# Patient Record
Sex: FEMALE | Race: BLACK OR AFRICAN AMERICAN | NOT HISPANIC OR LATINO | Employment: FULL TIME | URBAN - METROPOLITAN AREA
[De-identification: names, ages, dates, MRNs, and addresses within clinical notes are randomized per-mention and may not be internally consistent; named-entity substitution may affect disease eponyms.]

---

## 2022-05-06 ENCOUNTER — PATIENT MESSAGE (OUTPATIENT)
Dept: OBSTETRICS AND GYNECOLOGY | Facility: CLINIC | Age: 22
End: 2022-05-06

## 2023-02-27 ENCOUNTER — NURSE TRIAGE (OUTPATIENT)
Dept: ADMINISTRATIVE | Facility: CLINIC | Age: 23
End: 2023-02-27
Payer: MEDICAID

## 2023-02-27 NOTE — TELEPHONE ENCOUNTER
Patient states she is currently pregnant, unknown weeks gestation and c/o morning sickness, abdominal pain and weakness. Patient states she does not have an OB/Gyn at this time. Patient states she vomits once a day in the AM.    Care Advice given to See OB/Gyn or to Visit the [facility] today. Patient states understanding of care advice and cites no additional concerns at this time.       Reason for Disposition   MILD vomiting (e.g., 1-2 times / day) and present > 3 days and started after the 9th week of pregnancy    Additional Information   Negative: Sounds like a life-threatening emergency to the triager   Negative: Shock suspected (e.g., cold/pale/clammy skin, too weak to stand, low BP, rapid pulse)   Negative: Sounds like a life-threatening emergency to the triager   Nausea or vomiting and pregnancy < 20 weeks   Negative: Vaginal bleeding and pregnant < 20 weeks   Negative: Abdominal pain and pregnant < 20 weeks   Negative: Vomiting did NOT begin in early pregnancy (i.e., 4th-8th week of pregnancy) OR 20 or more weeks pregnant at time of call   Negative: Headache is main symptom   Negative: Vomiting red blood or black (coffee ground) material   Negative: Insulin-dependent diabetes (Type I) and glucose > 400 mg/dL (22 mmol/L)   Negative: Recent head injury (within last 3 days)   Negative: Recent abdominal injury (within last 3 days)   Negative: Severe pain in one eye   Negative: SEVERE vomiting (e.g., > 6 times / day) and present > 8 hours   Negative: Unable to keep ANY liquids down (without vomiting) this past 24 hours   Negative: Drinking very little and has signs of dehydration (e.g., no urine > 12 hours, very dry mouth)   Negative: Patient sounds very sick or weak to the triager   Negative: MODERATE vomiting (e.g., 3 - 5 times / day) and present > 3 days   Negative: Weight loss > 5 pounds (2.5 kg) in last 2 weeks   Negative: Pain or burning with passing urine (urination)   Negative:  Substance use (drug use) or unhealthy alcohol use, known or suspected   Negative: High-risk adult (e.g., diabetes mellitus, AIDS/HIV)   Negative: Patient wants to be seen   Negative: Vomiting an essential or prescribed medication (Exception: prenatal vitamins)    Protocols used: Nausea-A-OH, Pregnancy - Morning Sickness (Nausea and Vomiting of Pregnancy)-A-OH

## 2023-02-28 ENCOUNTER — HOSPITAL ENCOUNTER (EMERGENCY)
Facility: HOSPITAL | Age: 23
Discharge: HOME | End: 2023-02-28
Attending: EMERGENCY MEDICINE
Payer: MEDICAID

## 2023-02-28 VITALS
DIASTOLIC BLOOD PRESSURE: 59 MMHG | BODY MASS INDEX: 24.18 KG/M2 | WEIGHT: 128.06 LBS | OXYGEN SATURATION: 100 % | RESPIRATION RATE: 20 BRPM | SYSTOLIC BLOOD PRESSURE: 92 MMHG | HEART RATE: 81 BPM | HEIGHT: 61 IN | TEMPERATURE: 98 F

## 2023-02-28 DIAGNOSIS — O20.0 THREATENED ABORTION: Primary | ICD-10-CM

## 2023-02-28 DIAGNOSIS — N39.0 URINARY TRACT INFECTION WITHOUT HEMATURIA, SITE UNSPECIFIED: ICD-10-CM

## 2023-02-28 LAB
ABO + RH BLD: NORMAL
ALBUMIN SERPL BCP-MCNC: 4 G/DL (ref 3.5–5.2)
ALP SERPL-CCNC: 54 U/L (ref 55–135)
ALT SERPL W/O P-5'-P-CCNC: 12 U/L (ref 10–44)
ANION GAP SERPL CALC-SCNC: 9 MMOL/L (ref 8–16)
AST SERPL-CCNC: 15 U/L (ref 10–40)
B-HCG UR QL: POSITIVE
BACTERIA #/AREA URNS HPF: ABNORMAL /HPF
BASOPHILS # BLD AUTO: 0.08 K/UL (ref 0–0.2)
BASOPHILS NFR BLD: 0.7 % (ref 0–1.9)
BILIRUB SERPL-MCNC: 0.5 MG/DL (ref 0.1–1)
BILIRUB UR QL STRIP: NEGATIVE
BUN SERPL-MCNC: 6 MG/DL (ref 6–20)
CALCIUM SERPL-MCNC: 9.3 MG/DL (ref 8.7–10.5)
CHLORIDE SERPL-SCNC: 102 MMOL/L (ref 95–110)
CLARITY UR: ABNORMAL
CO2 SERPL-SCNC: 22 MMOL/L (ref 23–29)
COLOR UR: YELLOW
CREAT SERPL-MCNC: 0.6 MG/DL (ref 0.5–1.4)
CTP QC/QA: YES
DIFFERENTIAL METHOD: ABNORMAL
EOSINOPHIL # BLD AUTO: 0 K/UL (ref 0–0.5)
EOSINOPHIL NFR BLD: 0.3 % (ref 0–8)
ERYTHROCYTE [DISTWIDTH] IN BLOOD BY AUTOMATED COUNT: 14.1 % (ref 11.5–14.5)
EST. GFR  (NO RACE VARIABLE): >60 ML/MIN/1.73 M^2
GLUCOSE SERPL-MCNC: 77 MG/DL (ref 70–110)
GLUCOSE UR QL STRIP: NEGATIVE
HCG INTACT+B SERPL-ACNC: NORMAL MIU/ML
HCT VFR BLD AUTO: 37.3 % (ref 37–48.5)
HGB BLD-MCNC: 12.5 G/DL (ref 12–16)
HGB UR QL STRIP: NEGATIVE
HYALINE CASTS #/AREA URNS LPF: 25 /LPF
IMM GRANULOCYTES # BLD AUTO: 0.04 K/UL (ref 0–0.04)
IMM GRANULOCYTES NFR BLD AUTO: 0.4 % (ref 0–0.5)
KETONES UR QL STRIP: ABNORMAL
LEUKOCYTE ESTERASE UR QL STRIP: NEGATIVE
LYMPHOCYTES # BLD AUTO: 2 K/UL (ref 1–4.8)
LYMPHOCYTES NFR BLD: 17.7 % (ref 18–48)
MCH RBC QN AUTO: 26.4 PG (ref 27–31)
MCHC RBC AUTO-ENTMCNC: 33.5 G/DL (ref 32–36)
MCV RBC AUTO: 79 FL (ref 82–98)
MICROSCOPIC COMMENT: ABNORMAL
MONOCYTES # BLD AUTO: 0.9 K/UL (ref 0.3–1)
MONOCYTES NFR BLD: 7.8 % (ref 4–15)
NEUTROPHILS # BLD AUTO: 8 K/UL (ref 1.8–7.7)
NEUTROPHILS NFR BLD: 73.1 % (ref 38–73)
NITRITE UR QL STRIP: POSITIVE
NRBC BLD-RTO: 0 /100 WBC
PH UR STRIP: 6 [PH] (ref 5–8)
PLATELET # BLD AUTO: 363 K/UL (ref 150–450)
PMV BLD AUTO: 9.2 FL (ref 9.2–12.9)
POTASSIUM SERPL-SCNC: 3.8 MMOL/L (ref 3.5–5.1)
PROT SERPL-MCNC: 7.7 G/DL (ref 6–8.4)
PROT UR QL STRIP: ABNORMAL
RBC # BLD AUTO: 4.74 M/UL (ref 4–5.4)
RBC #/AREA URNS HPF: 7 /HPF (ref 0–4)
SODIUM SERPL-SCNC: 133 MMOL/L (ref 136–145)
SP GR UR STRIP: >1.03 (ref 1–1.03)
SQUAMOUS #/AREA URNS HPF: 11 /HPF
URN SPEC COLLECT METH UR: ABNORMAL
UROBILINOGEN UR STRIP-ACNC: NEGATIVE EU/DL
WBC # BLD AUTO: 10.99 K/UL (ref 3.9–12.7)
WBC #/AREA URNS HPF: 11 /HPF (ref 0–5)

## 2023-02-28 PROCEDURE — 86900 BLOOD TYPING SEROLOGIC ABO: CPT | Performed by: EMERGENCY MEDICINE

## 2023-02-28 PROCEDURE — 25000003 PHARM REV CODE 250: Performed by: EMERGENCY MEDICINE

## 2023-02-28 PROCEDURE — 96374 THER/PROPH/DIAG INJ IV PUSH: CPT

## 2023-02-28 PROCEDURE — 63600175 PHARM REV CODE 636 W HCPCS: Performed by: EMERGENCY MEDICINE

## 2023-02-28 PROCEDURE — 84702 CHORIONIC GONADOTROPIN TEST: CPT | Performed by: EMERGENCY MEDICINE

## 2023-02-28 PROCEDURE — 80053 COMPREHEN METABOLIC PANEL: CPT | Performed by: EMERGENCY MEDICINE

## 2023-02-28 PROCEDURE — 81001 URINALYSIS AUTO W/SCOPE: CPT | Performed by: EMERGENCY MEDICINE

## 2023-02-28 PROCEDURE — 85025 COMPLETE CBC W/AUTO DIFF WBC: CPT | Performed by: EMERGENCY MEDICINE

## 2023-02-28 PROCEDURE — 87086 URINE CULTURE/COLONY COUNT: CPT | Performed by: EMERGENCY MEDICINE

## 2023-02-28 PROCEDURE — 99285 EMERGENCY DEPT VISIT HI MDM: CPT | Mod: 25

## 2023-02-28 PROCEDURE — 81025 URINE PREGNANCY TEST: CPT | Performed by: EMERGENCY MEDICINE

## 2023-02-28 RX ORDER — CEPHALEXIN 500 MG/1
500 CAPSULE ORAL EVERY 12 HOURS
Qty: 14 CAPSULE | Refills: 0 | Status: SHIPPED | OUTPATIENT
Start: 2023-02-28 | End: 2023-03-07

## 2023-02-28 RX ADMIN — CEFTRIAXONE 1 G: 1 INJECTION, SOLUTION INTRAVENOUS at 04:02

## 2023-02-28 RX ADMIN — SODIUM CHLORIDE 1000 ML: 0.9 INJECTION, SOLUTION INTRAVENOUS at 03:02

## 2023-02-28 NOTE — ED PROVIDER NOTES
Encounter Date: 2/28/2023       History     Chief Complaint   Patient presents with    Abdominal Pain     Abd pain with nausea and vomiting and general fatigue x5 days, pt is 6 weeks pregnant     22-year-old female presents emergency department with complaint of lower pelvic cramping.  Denies any vaginal bleeding or discharge.  Patient reports that she is approximately 6 weeks pregnant last menstrual cycle January 13th.  Patient has not seen any OBGYN as of now.     Review of patient's allergies indicates:  No Known Allergies  No past medical history on file.  No past surgical history on file.  No family history on file.     Review of Systems   Constitutional: Negative.    HENT: Negative.     Respiratory: Negative.     Cardiovascular: Negative.    Gastrointestinal: Negative.    Genitourinary:  Positive for pelvic pain. Negative for dyspareunia, dysuria, enuresis, flank pain, frequency, vaginal bleeding, vaginal discharge and vaginal pain.   Musculoskeletal: Negative.    Neurological: Negative.    Hematological: Negative.    Psychiatric/Behavioral: Negative.     All other systems reviewed and are negative.    Physical Exam     Initial Vitals [02/28/23 1319]   BP Pulse Resp Temp SpO2   98/62 77 16 98.2 °F (36.8 °C) 99 %      MAP       --         Physical Exam    Nursing note and vitals reviewed.  Constitutional: She appears well-developed and well-nourished.   HENT:   Head: Normocephalic and atraumatic.   Eyes: EOM are normal. Pupils are equal, round, and reactive to light.   Cardiovascular:  Normal rate, regular rhythm, normal heart sounds and intact distal pulses.           Pulmonary/Chest: Breath sounds normal.   Abdominal: Abdomen is soft. Bowel sounds are normal.     Neurological: She is alert and oriented to person, place, and time. She has normal strength.   Skin: Skin is warm and dry.   Psychiatric: She has a normal mood and affect.       ED Course   Procedures  Labs Reviewed   CBC W/ AUTO DIFFERENTIAL -  Abnormal; Notable for the following components:       Result Value    MCV 79 (*)     MCH 26.4 (*)     Gran # (ANC) 8.0 (*)     Gran % 73.1 (*)     Lymph % 17.7 (*)     All other components within normal limits    Narrative:     Release to patient->Immediate   COMPREHENSIVE METABOLIC PANEL - Abnormal; Notable for the following components:    Sodium 133 (*)     CO2 22 (*)     Alkaline Phosphatase 54 (*)     All other components within normal limits    Narrative:     Release to patient->Immediate   URINALYSIS, REFLEX TO URINE CULTURE - Abnormal; Notable for the following components:    Appearance, UA Hazy (*)     Specific Gravity, UA >1.030 (*)     Protein, UA 1+ (*)     Ketones, UA 2+ (*)     Nitrite, UA Positive (*)     All other components within normal limits    Narrative:     Specimen Source->Urine   URINALYSIS MICROSCOPIC - Abnormal; Notable for the following components:    RBC, UA 7 (*)     WBC, UA 11 (*)     Bacteria Few (*)     Hyaline Casts, UA 25 (*)     All other components within normal limits    Narrative:     Specimen Source->Urine   POCT URINE PREGNANCY - Abnormal; Notable for the following components:    POC Preg Test, Ur Positive (*)     All other components within normal limits   CULTURE, URINE   HCG, QUANTITATIVE    Narrative:     Release to patient->Immediate   GROUP & RH          Imaging Results              US OB <14 Wks TransAbd & TransVag, Single Gestation (XPD) (Final result)  Result time 02/28/23 16:49:28   Procedure changed from US OB Transvaginal     Final result by Mychal Phelps MD (02/28/23 16:49:28)                   Narrative:    US OB <14 WEEKS, TRANSABDOM & TRANSVAG, SINGLE GESTATION (XPD)    CLINICAL HISTORY:  22 years Female Vag Bleeding positive UPT    COMPARISON: None    FINDINGS: Transabdominal and transvaginal scanning was performed.    Uterus measures 7.3 x 4.0 x 6.4 cm.    Intrauterine gestational sac with mean sac diameter of 2.1 cm (6 weeks 4 days) yolks sac and fetal  pole identified. Crown-rump length of 4.4 mm (6 weeks 1 day). Fetal heart rate of 116 bpm.    Right ovary measures 4.7 x 3.0 x 3.7 cm. Appropriate Doppler flow to the right ovary was documented. Left ovary measures 3.4 x 1.9 x 2.0 cm. Appropriate Doppler flow to the left ovary was documented.    IMPRESSION:    Single, living, intrauterine gestation with composite estimated gestational age of 6 weeks 3 days (+/- 3 days).    Electronically signed by:  Mychal Phelps MD  2/28/2023 4:49 PM CST Workstation: [id number]                                     Medications   sodium chloride 0.9% bolus 1,000 mL 1,000 mL (0 mLs Intravenous Stopped 2/28/23 1611)   cefTRIAXone (ROCEPHIN) 1 g/50 mL D5W IVPB (0 g Intravenous Stopped 2/28/23 1654)     Medical Decision Making:   History:   Old Records Summarized: records from previous admission(s).  Initial Assessment:   22-year-old female presents emergency department with complaint of lower pelvic cramping.  Denies any vaginal bleeding or discharge.  Patient reports that she is approximately 6 weeks pregnant last menstrual cycle January 13th.  Patient has not seen any OBGYN as of now.     Differential Diagnosis:   Considerations include UTI, threatened AB, ectopic pregnancy  Clinical Tests:   Lab Tests: Ordered and Reviewed  Radiological Study: Ordered and Reviewed  Medical Tests: Ordered and Reviewed  ED Management:  22-year-old well-appearing female presents emergency department reports that she is approximately 6 weeks pregnant and had pelvic cramping denies any vaginal bleeding, or abnormal vaginal discharge.  Last menstrual cycle 1/ 13/2023, this is patient's 1st pregnancy and has not yet followed up with OBGYN.  She is Rh positive, labs reveal UTI she is no flank pain, no history of fever, she reports that she was mildly nauseated however denies vomiting, and tolerating oral fluids currently and has no white count she has nothing consistent with pyelonephritis she was given IV  Rocephin, she will be discharged home with Keflex.  Ultrasound performed reveals single live intrauterine pregnancy 6 weeks 3 days patient will be discharged home with return precautions.           Attending Attestation:     Physician Attestation Statement for NP/PA:       Other NP/PA Attestation Additions:    History of Present Illness: I was not called upon to see this patient but was available for consultation                           Clinical Impression:   Final diagnoses:  [O20.0] Threatened  (Primary)  [N39.0] Urinary tract infection without hematuria, site unspecified        ED Disposition Condition    Discharge Stable          ED Prescriptions       Medication Sig Dispense Start Date End Date Auth. Provider    cephALEXin (KEFLEX) 500 MG capsule Take 1 capsule (500 mg total) by mouth every 12 (twelve) hours. for 7 days 14 capsule 2023 3/7/2023 DANIEL Keane          Follow-up Information       Follow up With Specialties Details Why Contact Info    Elizabeth Padilla MD Obstetrics, Obstetrics and Gynecology, Maternal and Fetal Medicine Schedule an appointment as soon as possible for a visit in 2 days  [address]  [address]  [facility]  [facility] [address]  [phone]               DANIEL Keane  23 1726       Jeff Farmer MD  23 1832

## 2023-02-28 NOTE — DISCHARGE INSTRUCTIONS
Increase fluids  Pelvic rest as discussed   Take antibiotics as directed until all gone  Follow-up as directed  Return for any concerns

## 2023-03-03 LAB — BACTERIA UR CULT: NO GROWTH

## 2023-04-10 ENCOUNTER — HOSPITAL ENCOUNTER (EMERGENCY)
Facility: HOSPITAL | Age: 23
Discharge: HOME | End: 2023-04-10
Attending: EMERGENCY MEDICINE
Payer: MEDICAID

## 2023-04-10 VITALS
HEART RATE: 103 BPM | OXYGEN SATURATION: 98 % | BODY MASS INDEX: 26.5 KG/M2 | WEIGHT: 135 LBS | SYSTOLIC BLOOD PRESSURE: 123 MMHG | HEIGHT: 60 IN | DIASTOLIC BLOOD PRESSURE: 65 MMHG | RESPIRATION RATE: 18 BRPM | TEMPERATURE: 98 F

## 2023-04-10 DIAGNOSIS — Z34.91 FIRST TRIMESTER PREGNANCY: Primary | ICD-10-CM

## 2023-04-10 PROCEDURE — 99281 EMR DPT VST MAYX REQ PHY/QHP: CPT

## 2023-04-10 NOTE — ED PROVIDER NOTES
Encounter Date: 4/10/2023    SCRIBE #1 NOTE: I, Precious Sherwood, am scribing for, and in the presence of,  Hiro Devine MD.     History     Chief Complaint   Patient presents with    Pelvic Pain     X 4 days / pregnant  approx. 11 weeks      Time seen by provider: 6:01 PM on 04/10/2023    Leland Hyde is a 22 y.o. female (, P:0, A:0) who presents to the ED with an onset of intermittent lower abdominal pain described as cramping that began 4 days ago. Patient notes Sx are most prominent in the morning. The patient states she is 11 weeks pregnant. The patent states she followed with OB/GYN in [place], [place] on  where she had an US completed. Patient notes she is currently searching for an OB/GYN in [place], [place] due to recent move. She also c/o constipation. The patient denies vaginal bleeding, vaginal discharge, flank pain, dysuria, or any other symptoms at this time. No pertinent PMHx or PSHx.       The history is provided by the patient.   Review of patient's allergies indicates:  No Known Allergies  No past medical history on file.  No past surgical history on file.  No family history on file.     Review of Systems   Constitutional:  Negative for fever.   Respiratory:  Negative for shortness of breath.    Gastrointestinal:  Positive for abdominal pain and constipation.   Genitourinary:  Negative for dysuria, flank pain, vaginal bleeding and vaginal discharge.   Musculoskeletal:  Negative for gait problem.   Neurological:  Negative for weakness.   Psychiatric/Behavioral:  Negative for confusion.    All other systems reviewed and are negative.    Physical Exam     Initial Vitals [04/10/23 1747]   BP Pulse Resp Temp SpO2   (!) 103/53 100 20 98.2 °F (36.8 °C) 98 %      MAP       --         Physical Exam    Nursing note and vitals reviewed.  Constitutional: She appears well-developed and well-nourished.   HENT:   Head: Normocephalic and atraumatic.   Eyes: Conjunctivae are normal.   Neck: Neck  supple.   Normal range of motion.  Cardiovascular:  Normal rate, regular rhythm and normal heart sounds.     Exam reveals no gallop and no friction rub.       No murmur heard.  Pulmonary/Chest: Effort normal and breath sounds normal. No respiratory distress. She has no wheezes. She has no rhonchi. She has no rales.   Abdominal: Abdomen is soft. She exhibits no distension. There is no abdominal tenderness.   Fetal heart tones detected at 155 bpm on Bedside US.    Musculoskeletal:         General: Normal range of motion.      Cervical back: Normal range of motion and neck supple.     Neurological: She is alert and oriented to person, place, and time.   Skin: Skin is warm and dry. No erythema.   Psychiatric: She has a normal mood and affect.       ED Course   Procedures  Labs Reviewed - No data to display       Imaging Results    None          Medications - No data to display  Medical Decision Making:   History:   Old Medical Records: I decided to obtain old medical records.  Patient states she was having some mild abdominal cramping.  She has no dysuria.  Her vital signs are stable.  She is pregnant.  I did a bedside ultrasound showed a heart rate of a proximally 155 with good fetal movement.  She has no vaginal bleeding.  She is already had an ultrasound by her OBGYN which showed an intrauterine pregnancy.  She is no vaginal bleeding vaginal discharge.  I do not think she needs a pelvic exam at this time.  She has no abdominal tenderness at this time.  She likely has some mild pelvic pain and pregnancy.  I do not think she is a urinary tract infection, acute appendicitis, ovarian torsion, bowel obstruction, she may have constipation.        Scribe Attestation:   Scribe #1: I performed the above scribed service and the documentation accurately describes the services I performed. I attest to the accuracy of the note.                 I, Dr. Hiro Devine personally performed the services described in this  documentation. All medical record entries made by the scribe were at my direction and in my presence.  I have reviewed the chart and agree that the record reflects my personal performance and is accurate and complete. Hiro Devine MD.  1:18 PM 04/12/2023    DISCLAIMER: This note was prepared with Dragon NaturallySpeaking voice recognition transcription software. Garbled syntax, mangled pronouns, and other bizarre constructions may be attributed to that software system   Clinical Impression:   Final diagnoses:  [Z34.91] First trimester pregnancy (Primary)        ED Disposition Condition    Discharge Stable          ED Prescriptions    None       Follow-up Information       Follow up With Specialties Details Why Contact Info    Carlee Dexter MD Obstetrics and Gynecology Schedule an appointment as soon as possible for a visit   [address] [facility] [place] [address]  [phone]               Hiro Devine MD  04/12/23 9354

## 2025-03-02 ENCOUNTER — TELEPHONE (OUTPATIENT)
Dept: OBSTETRICS AND GYNECOLOGY | Facility: HOSPITAL | Age: 25
End: 2025-03-02

## 2025-03-02 NOTE — PROGRESS NOTES
"Pt presented to [facility] with c/o LOF x 1 episode "30 minutes ago" that was clear in color. Pt is not currently leaking and denies uterine contractions. EGA 33 weeks and receives care at [facility]. Pt says her baby is FGR. Pt originally said she has not felt her baby move since "yesterday." Pt debating staying here or driving to . This RN along with Kristin ROSE RN and Kayleigh ROCKWELL RN advised pt to stay and been seen. Pt replied saying that she "just felt" the baby move and wants to make the drive to .  "

## 2025-07-28 ENCOUNTER — TELEPHONE (OUTPATIENT)
Dept: OBSTETRICS AND GYNECOLOGY | Facility: CLINIC | Age: 25
End: 2025-07-28
Payer: MEDICAID

## 2025-07-28 NOTE — TELEPHONE ENCOUNTER
Several attempts were made via telephone to contact patient with no answer, instructions were left on how to contact office.